# Patient Record
(demographics unavailable — no encounter records)

---

## 2024-11-02 NOTE — DISCUSSION/SUMMARY
[de-identified] : 81 Y F w/ cervical spondylosis & cervical stenosis. Normal neurological exam. No signs or symptoms of cervical myelopathy.    [facility] spine MRI reviewed & discussed with patient today. C2-3: R>L facet arthritis, grade 1 listhesis, C3-4: adv LT facet arthritis, severe BL FS, grade 1 listhesis. C5-6: adv DDD, large osteophyte, mild compression of RT alexa cord, severe FS R>L. Best to exhaust conservative treatment options first.  Patient was provided with a referral for cervical physical therapy to work on stretching, strengthening and range of motion. Patient was provided with a cervical home exercise program.  F/U in 6-8 WKS.   Prior to appointment and during encounter with patient extensive medical records were reviewed including but not limited to, hospital records, out patient records, imaging results, and lab data. During this appointment the patient was examined, diagnoses were discussed and explained in a face to face manner. In addition extensive time was spent reviewing aforementioned diagnostic studies. Counseling including abnormal image results, differential diagnoses, treatment options, risk and benefits, lifestyle changes, current condition, and current medications was performed. Patient's comments, questions, and concerns were address and patient verbalized understanding. Based on this patient's presentation at our office, which is an orthopedic spine surgeon's office, this patient inherently / intrinsically has a risk, however minute, of developing issues such as Cauda equina syndrome, bowel and bladder changes, or progression of motor or neurological deficits such as paralysis which may be permanent.    I, Chen Sage, attest that this documentation has been prepared under the direction and in the presence of provider Justin Mansfield MD.

## 2024-11-02 NOTE — PHYSICAL EXAM
[de-identified] : Constitutional: - General Appearance: Unremarkable Body Habitus Well Developed Well Nourished Body Habitus No Deformities Well Groomed Ability To communicate: Normal Neurologic: Global sensation is intact to upper and lower extremities. See examination of Neck and/or Spine for exceptions. Orientation to Time, Place and Person is: Normal Mood And Affect is Normal Skin: - Head/Face, Right Upper/Lower Extremity, Left Upper/Lower Extremity: Normal See Examination of Neck and/or Spine for exceptions Cardiovascular: Peripheral Cardiovascular System is Normal Palpation of Lymph Nodes: Normal Palpation of lymph nodes in: Axilla, Cervical, Inguinal Abnormal Palpation of lymph nodes in: None  [] : negative Honeycutt reflex [de-identified] : arthritic MCP joints.  [TWNoteComboBox7] : forward flexion 30 degrees [de-identified] : extension 30 degrees [de-identified] : left lateral rotation 60 degrees [TWNoteComboBox6] : right lateral rotation 45 degrees

## 2024-11-02 NOTE — DATA REVIEWED
[FreeTextEntry1] : On my interpretation of these images from [facility] 10/18/24.  I have additionally reviewed the radiologist report. MRI-  C2-3:  R>L facet arthritis, grade 1 listhesis,  C3-4: adv LT facet arthritis, severe BL FS, grade 1 listhesis.  C4-5: adv facet arthritis, LT stenosis.   C5-6: adv DDD, large osteophyte, mild compression of RT alexa cord, severe FS R>L C6-7: adv DDD, mild FS.  C7-T1- Grade 1 listhesis, adv DDD, mod BL FS.

## 2024-11-02 NOTE — HISTORY OF PRESENT ILLNESS
[6] : 6 [5] : 5 [Tightness] : tightness [Tingling] : tingling [Constant] : constant [Household chores] : household chores [Leisure] : leisure [de-identified] : 10/23/2024:  81 Y F presenting today for an initial evaluation. 3 WKS prior, the pt experienced an episode of high blood pressure and was seen at [facility]. She was sent home and then presented again at another ED, where a cervical CT scan and EKGs were performed. She was seen by a cardiologist, but no clear reason was identified for the rise in her blood pressure. The pt reports that the high blood pressure episode was followed by an electrical sensation in the LT side of her trap and SCM. She describes the sensation as feeling like a 'strap over her neck on the RT side' that is constant.  [FreeTextEntry5] : 1mos of cervical pain along with Hypertension and went into [facility], [facility].  Patient states no injury or fall but fees tingling and tightness around the cervical area.  Pt saw PCP and MRI Cervical was ordered from [facility].

## 2024-11-02 NOTE — PHYSICAL EXAM
[de-identified] : Constitutional: - General Appearance: Unremarkable Body Habitus Well Developed Well Nourished Body Habitus No Deformities Well Groomed Ability To communicate: Normal Neurologic: Global sensation is intact to upper and lower extremities. See examination of Neck and/or Spine for exceptions. Orientation to Time, Place and Person is: Normal Mood And Affect is Normal Skin: - Head/Face, Right Upper/Lower Extremity, Left Upper/Lower Extremity: Normal See Examination of Neck and/or Spine for exceptions Cardiovascular: Peripheral Cardiovascular System is Normal Palpation of Lymph Nodes: Normal Palpation of lymph nodes in: Axilla, Cervical, Inguinal Abnormal Palpation of lymph nodes in: None  [] : negative Honeycutt reflex [de-identified] : arthritic MCP joints.  [TWNoteComboBox7] : forward flexion 30 degrees [de-identified] : extension 30 degrees [de-identified] : left lateral rotation 60 degrees [TWNoteComboBox6] : right lateral rotation 45 degrees

## 2024-11-02 NOTE — HISTORY OF PRESENT ILLNESS
[6] : 6 [5] : 5 [Tightness] : tightness [Tingling] : tingling [Constant] : constant [Household chores] : household chores [Leisure] : leisure [de-identified] : 10/23/2024:  81 Y F presenting today for an initial evaluation. 3 WKS prior, the pt experienced an episode of high blood pressure and was seen at [place] [facility]. She was sent home and then presented again at another ED, where a cervical CT scan and EKGs were performed. She was seen by a cardiologist, but no clear reason was identified for the rise in her blood pressure. The pt reports that the high blood pressure episode was followed by an electrical sensation in the LT side of her trap and SCM. She describes the sensation as feeling like a 'strap over her neck on the RT side' that is constant.  [FreeTextEntry5] : 1mos of cervical pain along with Hypertension and went into [facility], [facility].  Patient states no injury or fall but fees tingling and tightness around the cervical area.  Pt saw PCP and MRI Cervical was ordered from [facility].

## 2024-11-02 NOTE — DATA REVIEWED
[FreeTextEntry1] : On my interpretation of these images from [facility] 10/18/24.  I have additionally reviewed the radiologist report. MRI-  C2-3:  R>L facet arthritis, grade 1 listhesis,  C3-4: adv LT facet arthritis, severe BL FS, grade 1 listhesis.  C4-5: adv facet arthritis, LT stenosis.   C5-6: adv DDD, large osteophyte, mild compression of RT alexa cord, severe FS R>L C6-7: adv DDD, mild FS.  C7-T1- Grade 1 listhesis, adv DDD, mod BL FS.